# Patient Record
(demographics unavailable — no encounter records)

---

## 2025-03-18 NOTE — HEALTH RISK ASSESSMENT
[Never (0 pts)] : Never (0 points) [No] : In the past 12 months have you used drugs other than those required for medical reasons? No [No falls in past year] : Patient reported no falls in the past year [0] : 2) Feeling down, depressed, or hopeless: Not at all (0) [PHQ-2 Negative - No further assessment needed] : PHQ-2 Negative - No further assessment needed [[id number]] : 0 [Never] : Never [NO] : No [Patient reported colonoscopy was normal] : Patient reported colonoscopy was normal [HIV Test offered] : HIV Test offered [Hepatitis C test offered] : Hepatitis C test offered [Change in mental status noted] : No change in mental status noted [Language] : denies difficulty with language [Behavior] : denies difficulty with behavior [Learning/Retaining New Information] : denies difficulty learning/retaining new information [Handling Complex Tasks] : denies difficulty handling complex tasks [Reasoning] : denies difficulty with reasoning [Spatial Ability and Orientation] : denies difficulty with spatial ability and orientation [With Significant Other] : lives with significant other [College] : College [] :  [Reports changes in hearing] : Reports no changes in hearing [Reports changes in vision] : Reports no changes in vision [Reports normal functional visual acuity (ie: able to read med bottle)] : Reports normal functional visual acuity [Reports changes in dental health] : Reports no changes in dental health

## 2025-03-18 NOTE — HISTORY OF PRESENT ILLNESS
[de-identified] : This is a 64-year-old patient with a history of of hypertension, type 2 diabetes, low back syndrome who is here today for his annual well examination

## 2025-03-18 NOTE — CURRENT MEDS
[Takes medication as prescribed] : takes [None] : Patient does not have any barriers to medication adherence [Yes] : Reviewed medication list for presence of high-risk medications. [Benzodiazepines] : benzodiazepines [Opioids] : opioids [FreeTextEntry1] : Patient does not take benzodiazepines or opioids

## 2025-03-18 NOTE — PHYSICAL EXAM
[Normal Sphincter Tone] : normal sphincter tone [No Mass] : no mass [Testes Mass (___cm)] : there were no testicular masses [Anus Abnormality] : the anus and perineum were normal [Rectal Exam - Rectum] : digital rectal exam was normal [Prostate Enlargement] : the prostate was not enlarged [Prostate Tenderness] : the prostate was not tender [No Prostate Nodules] : no prostate nodules [Normal] : affect was normal and insight and judgment were intact

## 2025-03-18 NOTE — ASSESSMENT
[FreeTextEntry1] : Problems Diabetes mellitus type 2 Hypertension Hypercholesterolemia Obesity Erectile dysfunction His blood pressure and his physical examination were normal except for being overweight.  His EKG did not reveal any acute changes.  Bloods were drawn to check his lipids A1c and PSA level

## 2025-07-22 NOTE — ASSESSMENT
[FreeTextEntry1] : Problems Diabetes mellitus Hypercholesterolemia Hypertension Assessment The patient presently is on Crestor 20 mg daily for his hypercholesterolemia.  Bloods were obtained for his SGOT SGPT alkaline phosphatase and lipids to assess the efficacy of the rosuvastatin 20 mg studies on him.  In addition he is on Jardiance 25 mg metformin 1000 milligrams twice a day for his diabetes.  Bloods were drawn to assess his hemoglobin A1c.  His blood pressure was very well controlled on losartan 25 mg daily as this is an angiotensin receptor receptor blocker and it can impact on renal function BUN/creatinine and electrolytes withdrawal

## 2025-07-22 NOTE — HISTORY OF PRESENT ILLNESS
[de-identified] : This is a 65 -year-old gentleman with a history of diabetes mellitus type 2, hypertension, hypercholesterolemia, who is here today for a follow-up visit.